# Patient Record
Sex: MALE | Race: WHITE
[De-identification: names, ages, dates, MRNs, and addresses within clinical notes are randomized per-mention and may not be internally consistent; named-entity substitution may affect disease eponyms.]

---

## 2018-05-18 ENCOUNTER — HOSPITAL ENCOUNTER (OUTPATIENT)
Dept: HOSPITAL 39 - LAB.O | Age: 83
End: 2018-05-18
Attending: INTERNAL MEDICINE
Payer: MEDICARE

## 2018-05-18 DIAGNOSIS — I48.91: ICD-10-CM

## 2018-05-18 DIAGNOSIS — R06.02: ICD-10-CM

## 2018-05-18 DIAGNOSIS — I50.20: Primary | ICD-10-CM

## 2018-09-21 ENCOUNTER — HOSPITAL ENCOUNTER (OUTPATIENT)
Dept: HOSPITAL 39 - CT | Age: 83
End: 2018-09-21
Attending: GENERAL PRACTICE
Payer: MEDICARE

## 2018-09-21 DIAGNOSIS — R91.1: ICD-10-CM

## 2018-09-21 DIAGNOSIS — R18.8: ICD-10-CM

## 2018-09-21 DIAGNOSIS — R06.02: Primary | ICD-10-CM

## 2018-09-21 DIAGNOSIS — R94.5: ICD-10-CM

## 2018-09-21 NOTE — CT
EXAM DESCRIPTION:  CT THORACIC ANGIOGRAM



CT ABDOMEN AND PELVIS WITH CONTRAST



CLINICAL HISTORY: Dyspnea, elevated d-dimer, ABNORMAL LFT'S

R94.5, abdominal distention



COMPARISON: None Available.



TECHNIQUE: CT thoracic angiogram was performed with pulmonary

embolus protocol. CT of the abdomen and pelvis are performed

during IV bolus administration of routine adult dose of nonionic

iodinated contrast. No oral contrast.



FINDINGS:



CT thoracic angiogram



No large central pulmonary emboli. The small peripheral branches

are difficult to evaluate due to respiratory motion as well as

incomplete opacification. No distinct filling defects surrounded

by contrast to suggest emboli in the smaller vessels. The central

vessels are well opacified. Large renal arteries may indicate

pulmonary hypertension.



No aortic aneurysm or dissection. Fluid around the aorta suggests

small pericardial effusion. The heart is enlarged with extensive

coronary calcification. Bilateral pleural effusions are present

of moderate size with partial volume loss of the dependent

portions of the lungs consistent with passive atelectasis.



Lung window images show extensive hazy groundglass edema

throughout both lungs and the pulmonary vessels are congested

consistent with volume overload or congestive failure.



A small mass is seen in the left upper lobe at the level of the

left pulmonary hilum (axial image 164, series 5) which measures

1.1 cm. This could be a small primary pulmonary neoplasm and

follow-up is recommended as per recommendations below.



No mediastinal mass or adenopathy. Gynecomastia is present. No

lower cervical or supraclavicular or axillary lymph node

enlargement. Old rib fractures on the left appear healed.

Coronal, sagittal and oblique MIP images are negative for

pulmonary emboli but artifacts are fairly extensive.



CT abdomen



Large amount of free fluid in the upper abdomen is consistent

with ascites. No calcified stones in the gallbladder. Kidney

cortex appears somewhat thinned bilaterally suggesting senescent

changes are chronic. Minimal disease. Correlate with renal

function studies. Cyst in the lateral left kidney appears benign

and measures 3.5 cm. Otherwise the liver, spleen, pancreas,

gallbladder, adrenal glands, and stomach are unremarkable in

appearance. No inflammation around the pancreas. No renal stones

or hydronephrosis.



No bowel dilatation to suggest obstruction.



No free air or free fluid.



CT pelvis



Appendix appears normal. Large amount of free fluid in the pelvis

consistent with ascites. No inflammation around the cecum or

terminal ileum or sigmoid colon. Bladder and distal ureters are

negative for stones. Normal enhancement of pelvic vessels. No

inguinal or lower pelvic adenopathy. Prostate is prominent 4.4 cm

in transverse dimension.



Bone window images are negative for fracture or lytic lesion.



Coronal and sagittal reformatted images confirm the findings.

Spleen is prominent on the coronal images. Mild surface

irregularity of the liver could indicate cirrhosis or chronic

scarring from passive hepatic congestion. Clinical correlation

recommended. Splenic length of 13 cm is increased for age. Severe

degenerative changes of the lumbar spine.



IMPRESSION: 



Negative for evidence of pulmonary embolic disease.



Nodular mass in the left upper lobe measures 1 cm. Follow-up

recommended as per recommendations below.



Large amount of free fluid in the abdomen consistent with

ascites. See above.



2017 Fleischner Society Recommendations for Single Solid Lung

Nodule Follow-Up based on size (average of long- and short-axis

diameters)



6-8 mm Low-Risk Patient: CT at 6-12 months then consider CT at

18-24 months

6-8 mm High-Risk Patient: CT at 6-12 months then CT at 18-24

months



>8 mm Low-Risk Patient: Consider CT, PET/CT or tissue sampling at

3 months

>8 mm High-Risk Patient: Same as for low-risk patient 







This exam was performed according to our departmental

dose-optimization program, which includes automated exposure

control, adjustment of the mA and/or kV according to patient size

and/or use of iterative reconstruction technique. Total DLP

equals 1979.97 mGycm.



Electronically signed by:  Dwight Haro MD  9/21/2018 3:34

PM CDT Workstation: [id number]

## 2018-09-21 NOTE — CT
EXAM DESCRIPTION:  CT THORACIC ANGIOGRAM



CT ABDOMEN AND PELVIS WITH CONTRAST



CLINICAL HISTORY: Dyspnea, elevated d-dimer, ABNORMAL LFT'S

R94.5, abdominal distention



COMPARISON: None Available.



TECHNIQUE: CT thoracic angiogram was performed with pulmonary

embolus protocol. CT of the abdomen and pelvis are performed

during IV bolus administration of routine adult dose of nonionic

iodinated contrast. No oral contrast.



FINDINGS:



CT thoracic angiogram



No large central pulmonary emboli. The small peripheral branches

are difficult to evaluate due to respiratory motion as well as

incomplete opacification. No distinct filling defects surrounded

by contrast to suggest emboli in the smaller vessels. The central

vessels are well opacified. Large renal arteries may indicate

pulmonary hypertension.



No aortic aneurysm or dissection. Fluid around the aorta suggests

small pericardial effusion. The heart is enlarged with extensive

coronary calcification. Bilateral pleural effusions are present

of moderate size with partial volume loss of the dependent

portions of the lungs consistent with passive atelectasis.



Lung window images show extensive hazy groundglass edema

throughout both lungs and the pulmonary vessels are congested

consistent with volume overload or congestive failure.



A small mass is seen in the left upper lobe at the level of the

left pulmonary hilum (axial image 164, series 5) which measures

1.1 cm. This could be a small primary pulmonary neoplasm and

follow-up is recommended as per recommendations below.



No mediastinal mass or adenopathy. Gynecomastia is present. No

lower cervical or supraclavicular or axillary lymph node

enlargement. Old rib fractures on the left appear healed.



CT abdomen



Large amount of free fluid in the upper abdomen is consistent

with ascites. No calcified stones in the gallbladder. Kidney

cortex appears somewhat thinned bilaterally suggesting senescent

changes are chronic. Minimal disease. Correlate with renal

function studies. Cyst in the lateral left kidney appears benign

and measures 3.5 cm. Otherwise the liver, spleen, pancreas,

gallbladder, adrenal glands, and stomach are unremarkable in

appearance. No inflammation around the pancreas. No renal stones

or hydronephrosis.



No bowel dilatation to suggest obstruction.



No free air or free fluid.



CT pelvis



Appendix appears normal. Large amount of free fluid in the pelvis

consistent with ascites. No inflammation around the cecum or

terminal ileum or sigmoid colon. Bladder and distal ureters are

negative for stones. Normal enhancement of pelvic vessels. No

inguinal or lower pelvic adenopathy. Prostate is prominent 4.4 cm

in transverse dimension.



Bone window images are negative for fracture or lytic lesion.



Coronal and sagittal reformatted images confirm the findings.

Spleen is prominent on the coronal images. Mild surface

irregularity of the liver could indicate cirrhosis or chronic

scarring from passive hepatic congestion. Clinical correlation

recommended. Splenic length of 13 cm is increased for age. Severe

degenerative changes of the lumbar spine.



IMPRESSION: 



Negative for evidence of pulmonary embolic disease.



Nodular mass in the left upper lobe measures 1 cm. Follow-up

recommended as per recommendations below.



Large amount of free fluid in the abdomen consistent with

ascites. See above.



2017 Fleischner Society Recommendations for Single Solid Lung

Nodule Follow-Up based on size (average of long- and short-axis

diameters)



6-8 mm Low-Risk Patient: CT at 6-12 months then consider CT at

18-24 months

6-8 mm High-Risk Patient: CT at 6-12 months then CT at 18-24

months



>8 mm Low-Risk Patient: Consider CT, PET/CT or tissue sampling at

3 months

>8 mm High-Risk Patient: Same as for low-risk patient 







This exam was performed according to our departmental

dose-optimization program, which includes automated exposure

control, adjustment of the mA and/or kV according to patient size

and/or use of iterative reconstruction technique. Total DLP

equals 1979.97 mGycm.



Electronically signed by:  Dwight Haro MD  9/21/2018 3:31

PM CDT Workstation: [id number]

## 2019-01-24 ENCOUNTER — HOSPITAL ENCOUNTER (OUTPATIENT)
Dept: HOSPITAL 39 - CT | Age: 84
End: 2019-01-24
Attending: GENERAL PRACTICE
Payer: MEDICARE

## 2019-01-24 DIAGNOSIS — R91.1: Primary | ICD-10-CM

## 2019-01-24 DIAGNOSIS — J90: ICD-10-CM

## 2019-01-24 DIAGNOSIS — J81.0: ICD-10-CM

## 2019-01-24 NOTE — CT
EXAM DESCRIPTION: Chest w/o Contrast



CLINICAL HISTORY: 84 years, Male, LUNG NODULE



COMPARISON: CTA chest September 21, 2018



TECHNIQUE: Thin-section noncontrast axial CT images are obtained

according to our protocol. Reconstructed MPR images are created

and reviewed as well.



FINDINGS:



Lungs: There is vascular congestion with mild pulmonary edema.

Minimal patchy infiltrate in the lingula suggests mild pneumonia.



Compared to the previous study, vessels are more congested and

the pulmonary edema was more prominent at that time. Patchy

infiltrate in the lingula appears similar.



Previous study showed a nodule in the left upper lobe peripheral

to the pulmonary hilum. On the present exam, small nodular

density is seen in the left upper lobe which measures 6.5 mm

abutting the upper left major fissure (image 45, series 4).

Previously the nodule measured 1.1 cm but the location is not

identical. At a similar level, minimal linear scar remains 5 mm

consistent with resolution of a previous inflammatory nodule at

that site (image 53, series 4). The present nodule is therefore

considered new and follow-up is recommended as per

recommendations below.



Tiny nodule in the anterior segment right upper lobe subpleural

measures 3.5 mm on image 54, series 4.



Pleural effusions are present moderate on the right and small on

the left. Pleural effusions are similar to previous study,

perhaps slightly decreased on the left.  



Mediastinum: Lymph nodes are normal in size.

 Normal vascular contours.  Heart size is normal with no

pericardial effusion. There is extensive coronary arterial

calcification.



Chest wall/axilla: No mass or adenopathy. Prominent gynecomastia

bilaterally. Advanced degenerative changes in the shoulders.



Lower neck/supraclavicular: No mass or adenopathy.



Upper abdomen: Large amount of free fluid in the upper abdomen is

consistent with ascites which appears increased compared to the

previous study. Liver surface is irregular suggesting cirrhosis,

possibly cardiac cirrhosis. Otherwise unremarkable upper

abdominal viscera partially visualized.



Coronal and sagittal reformatted images confirm the findings.



IMPRESSION: 



Large heart with vascular congestion and mild pulmonary edema.



Small to moderate pleural effusions.



Large amount of upper abdominal ascites.



Interval resolution of 1 cm nodule in the left upper lobe since

previous study.



New nodule in the left upper lobe near the upper major fissure

measuring 6.5 mm.



Small nodule in the anterior segment right upper lobe in area of

infiltrate 3.5 mm.



Follow-up as per recommendations below.



2017 Fleischner Society Recommendations for Multiple Solid Lung

Nodules Follow-Up base on size (average of long- and short-axis

diameters). Use most suspicious nodule for followup.



Nodule Size  6-8 mm Low-Risk Patient: CT at 3-6 months then

consider CT at 18-24 months

Nodule Size  6-8 mm High-Risk Patient: CT at 3-6 months then at

18-24 months







This exam was performed according to our departmental

dose-optimization program, which includes automated exposure

control, adjustment of the mA and/or kV according to patient size

and/or use of iterative reconstruction technique. Total DLP

equals 944 mGycm.



Electronically signed by:  Dwight Haro MD  1/24/2019 1:37

PM CST Workstation: [id number]

## 2019-04-08 ENCOUNTER — HOSPITAL ENCOUNTER (OUTPATIENT)
Dept: HOSPITAL 39 - US | Age: 84
End: 2019-04-08
Attending: SURGERY
Payer: MEDICARE

## 2019-04-08 DIAGNOSIS — K70.31: Primary | ICD-10-CM

## 2019-04-08 DIAGNOSIS — F10.10: ICD-10-CM

## 2019-04-08 NOTE — OP
DATE OF PROCEDURE:  04/08/19



PREOPERATIVE DIAGNOSIS:

1.  Ascites, believed to be secondary to alcoholic cirrhosis.



POSTOPERATIVE DIAGNOSIS: 

1.  Ascites, believed to be secondary to alcoholic cirrhosis.



PROCEDURE PERFORMED:

1.  Sonographically guided therapeutic paracentesis.



SURGEON:  Donald A. Behr, MD.



ASSISTANT:  None.



ANESTHESIA:  Local infiltration of 1% lidocaine.



INDICATION:  The patient is an 84-year-old male with ascites that is quite 
uncomfortable causing shortness of breath and abdominal pain and distention.  He
has previously undergone paracentesis twice and has been referred to me by the 
[facility] nurses for a therapeutic paracentesis.



FINDINGS:  Approximately 10,750 mL of clear, dark, mark-colored fluid was 
obtained.  



DESCRIPTION OF PROCEDURE:  After the patient was placed in the supine position 
in the Ultrasound Suite, the head of the bed was slightly elevated.  Both the 
right lower quadrant and left lower quadrant were inspected with the ultrasound 
and an area of fluid was identified.  At this point, the skin lateral to the 
ultrasound probe was prepped with Chlorhexidine and draped.  Local infiltration 
of anesthesia was obtained with 1% lidocaine.  A 22-gauge needle was introduced 
under ultrasound guidance and a small amount of fluid was obtained.  At this 
point, a stab wound was made with an 11 blade and paracentesis catheter was 
introduced under direct sonographic vision into the cavity of fluid.  The fluid 
was aspirated easily.  The catheter was advanced and the needle withdrawn.  It 
was then connected to the three-way stopcock and a total of 60 mL of fluid was 
obtained.  It was then switched to the suction canisters and the remaining fluid
was removed without significant difficulty.  When this was done, the catheter 
was removed.  Pressure was held for hemostasis and a pressure dressing was 
applied.  The patient tolerated the procedure well and had no discomfort and was
without significant symptomatology upon standing.  He left the hospital in a 
wheelchair.  Estimated blood loss was nil.  The patient tolerated the procedure 
well.  



#[id number]

MTDD

## 2019-04-09 NOTE — US
EXAM DESCRIPTION:

Paracentesis



CLINICAL HISTORY:

84 years Male, ASCITES AND ALCOHOLIC CIRRHOSIS



COMPARISON:

None.



FINDINGS:

Limited abdominal ultrasound was performed for guidance purposes

in conjunction with a planned paracentesis. Adequate fluid for

safe paracentesis is noted in the right lower quadrant, the skin

overlying this area was marked for paracentesis.



IMPRESSION:

Ascites, most apparent in the right lower quadrant, sufficient

for safe paracentesis.



Electronically signed by:  Ramu Aranda MD  4/9/2019 8:03 AM CDT

Workstation: [id number]